# Patient Record
(demographics unavailable — no encounter records)

---

## 2017-07-31 NOTE — ULTRASOUND REPORT
FINAL REPORT



PROCEDURE:  US OB TRANSVAGINAL



TECHNIQUE:  Real-time transvaginal sonography in multiple planes

of the pelvis was performed with image documentation. This

examination was performed without Doppler. Vascular

abnormalities, including ovarian torsion, will not be detectable

without Doppler evaluation. CPT 69646







HISTORY:  pain and preg 



COMPARISON:  No prior studies are available for comparison.



FINDINGS:  

UTERUS



Size: 6.4 x 4.2 x 5.1 cm.



Endometrial thickness:  mm.



Orientation: anteverted.



Cervix: Normal.



Fibroids/masses: None.



There is no evidence of any intrauterine gestational sac 



RIGHT Ovary: 3.5 x 2.7 x 2.7 cm. 



Appearance: Normal.



LEFT Ovary: 2.5 x 1.6 x 2.5 cm.



Appearance: Normal.



Pelvic fluid: None.



Other: None.



IMPRESSION:  

No evidence of intrauterine gestation. The study is otherwise

unremarkable.

## 2017-07-31 NOTE — ULTRASOUND REPORT
FINAL REPORT



PROCEDURE:  US OB \T\lt; = 14 WEEKS FETUS



TECHNIQUE:  Real-time transabdominal sonography in multiple

planes of pelvis was performed with image documentation. This

examination was performed without Doppler. Vascular

abnormalities, including ovarian torsion, will not be detectable

without Doppler evaluation. CPT 53434







HISTORY:  pain and preg 



COMPARISON:  No prior studies are available for comparison.



FINDINGS:  

UTERUS



Size: 6.4 x 4.2 x 5.1 cm.



Endometrial thickness: 10 mm.



Orientation: anteverted.



Cervix: Normal.



Fibroids/masses: None.



There is no intrauterine gestational sac 



RIGHT Ovary: 3.5 x 2.7 x 2.7 cm. 



Appearance: Normal.



LEFT Ovary: 2.5 x 1.6 x 2.5 cm.



Appearance: Normal.



Pelvic fluid: None.



Other: None.



IMPRESSION:  

No evidence of intrauterine gestational sac.



Otherwise unremarkable study

## 2017-08-01 NOTE — EMERGENCY DEPARTMENT REPORT
ED Abdominal Pain HPI





- General


Chief Complaint: Abdominal Pain


Stated Complaint: ABD PAIN/ PREGNANT


Time Seen by Provider: 08/01/17 03:26


Source: patient


Mode of arrival: Ambulatory


Limitations: No Limitations





- History of Present Illness


MD Complaint: abdominal pain


-: Gradual, week(s) (two weeks)


Location: diffuse


Quality: cramping





- Related Data


LMP (females 10-50): pregnant


 Previous Rx's











 Medication  Instructions  Recorded  Last Taken  Type


 


Ondansetron [Zofran Odt] 4 mg PO Q8HR PRN #14 tab.rapdis 08/01/17 Unknown Rx











 Allergies











Allergy/AdvReac Type Severity Reaction Status Date / Time


 


No Known Allergies Allergy   Unverified 07/31/17 21:44














ED Review of Systems


ROS: 


Stated complaint: ABD PAIN/ PREGNANT


Other details as noted in HPI





Comment: All other systems reviewed and negative


Constitutional: denies: chills, fever


ENT: denies: throat pain, hearing loss


Cardiovascular: denies: chest pain


Gastrointestinal: abdominal pain.  denies: nausea, vomiting


Genitourinary: denies: abnormal menses





ED Past Medical Hx





- Past Medical History


Previous Medical History?: No





- Surgical History


Past Surgical History?: Yes


Additional Surgical History: tonsilectomy





- Social History


Smoking Status: Former Smoker


Substance Use Type: None





- Medications


Home Medications: 


 Home Medications











 Medication  Instructions  Recorded  Confirmed  Last Taken  Type


 


Ondansetron [Zofran Odt] 4 mg PO Q8HR PRN #14 tab.rapdis 08/01/17  Unknown Rx














ED Physical Exam





- General


Limitations: No Limitations


General appearance: alert, in no apparent distress





- Eye


Eye exam: Present: normal appearance





- Neck


Neck exam: Present: normal inspection.  Absent: tenderness





- Respiratory


Respiratory exam: Present: normal lung sounds bilaterally.  Absent: respiratory 

distress, rales





- Cardiovascular


Cardiovascular Exam: Present: regular rate, normal rhythm, normal heart sounds





- GI/Abdominal


GI/Abdominal exam: Present: soft.  Absent: distended, tenderness, guarding, 

rebound, rigid, mass, bruit, pulsatile mass, hernia





- Neurological Exam


Neurological exam: Present: alert, oriented X3, CN II-XII intact





- Skin


Skin exam: Present: warm, intact, normal color





ED Course


 Vital Signs











  07/31/17





  21:44


 


Temperature 98.7 F


 


Pulse Rate 104 H


 


Respiratory 16





Rate 


 


Blood Pressure 128/72


 


O2 Sat by Pulse 100





Oximetry 














ED Medical Decision Making





- Lab Data


Result diagrams: 


 07/31/17 21:55





 07/31/17 21:55


Critical care attestation.: 


If time is entered above; I have spent that time in minutes in the direct care 

of this critically ill patient, excluding procedure time.








ED Disposition


Clinical Impression: 


 Abdominal pain affecting pregnancy





Disposition: DC-01 TO HOME OR SELFCARE


Is pt being admited?: No


Does the pt Need Aspirin: No


Condition: Stable


Instructions:  Abdominal Pain (ED)